# Patient Record
Sex: MALE | Race: ASIAN | Employment: UNEMPLOYED | URBAN - METROPOLITAN AREA
[De-identification: names, ages, dates, MRNs, and addresses within clinical notes are randomized per-mention and may not be internally consistent; named-entity substitution may affect disease eponyms.]

---

## 2022-02-18 ENCOUNTER — HOSPITAL ENCOUNTER (EMERGENCY)
Facility: CLINIC | Age: 52
Discharge: HOME | End: 2022-02-18
Attending: EMERGENCY MEDICINE | Admitting: EMERGENCY MEDICINE

## 2022-02-18 VITALS
DIASTOLIC BLOOD PRESSURE: 86 MMHG | WEIGHT: 117.5 LBS | BODY MASS INDEX: 20.06 KG/M2 | HEIGHT: 64 IN | OXYGEN SATURATION: 96 % | TEMPERATURE: 98.2 F | SYSTOLIC BLOOD PRESSURE: 129 MMHG | HEART RATE: 64 BPM | RESPIRATION RATE: 16 BRPM

## 2022-02-18 DIAGNOSIS — F10.920 ALCOHOLIC INTOXICATION WITHOUT COMPLICATION (H): ICD-10-CM

## 2022-02-18 DIAGNOSIS — T14.91XA SUICIDAL BEHAVIOR WITH ATTEMPTED SELF-INJURY (H): ICD-10-CM

## 2022-02-18 DIAGNOSIS — F10.94 ALCOHOL-INDUCED MOOD DISORDER (H): ICD-10-CM

## 2022-02-18 DIAGNOSIS — F10.10 ALCOHOL ABUSE, EPISODIC DRINKING BEHAVIOR: ICD-10-CM

## 2022-02-18 LAB
ALBUMIN SERPL-MCNC: 3.7 G/DL (ref 3.4–5)
ALP SERPL-CCNC: 63 U/L (ref 40–150)
ALT SERPL W P-5'-P-CCNC: 38 U/L (ref 0–70)
ANION GAP SERPL CALCULATED.3IONS-SCNC: 8 MMOL/L (ref 3–14)
APAP SERPL-MCNC: <2 MG/L (ref 10–30)
AST SERPL W P-5'-P-CCNC: 35 U/L (ref 0–45)
BASOPHILS # BLD AUTO: 0.1 10E3/UL (ref 0–0.2)
BASOPHILS NFR BLD AUTO: 1 %
BILIRUB SERPL-MCNC: 0.4 MG/DL (ref 0.2–1.3)
BUN SERPL-MCNC: 13 MG/DL (ref 7–30)
CALCIUM SERPL-MCNC: 8.3 MG/DL (ref 8.5–10.1)
CHLORIDE BLD-SCNC: 104 MMOL/L (ref 94–109)
CO2 SERPL-SCNC: 24 MMOL/L (ref 20–32)
CREAT SERPL-MCNC: 0.83 MG/DL (ref 0.66–1.25)
EOSINOPHIL # BLD AUTO: 0.5 10E3/UL (ref 0–0.7)
EOSINOPHIL NFR BLD AUTO: 5 %
ERYTHROCYTE [DISTWIDTH] IN BLOOD BY AUTOMATED COUNT: 12.1 % (ref 10–15)
ETHANOL SERPL-MCNC: 0.06 G/DL
ETHANOL SERPL-MCNC: 0.26 G/DL
GFR SERPL CREATININE-BSD FRML MDRD: >90 ML/MIN/1.73M2
GLUCOSE BLD-MCNC: 110 MG/DL (ref 70–99)
HCT VFR BLD AUTO: 48.6 % (ref 40–53)
HGB BLD-MCNC: 17.4 G/DL (ref 13.3–17.7)
HOLD SPECIMEN: NORMAL
HOLD SPECIMEN: NORMAL
IMM GRANULOCYTES # BLD: 0.1 10E3/UL
IMM GRANULOCYTES NFR BLD: 1 %
LYMPHOCYTES # BLD AUTO: 2.2 10E3/UL (ref 0.8–5.3)
LYMPHOCYTES NFR BLD AUTO: 21 %
MCH RBC QN AUTO: 32.2 PG (ref 26.5–33)
MCHC RBC AUTO-ENTMCNC: 35.8 G/DL (ref 31.5–36.5)
MCV RBC AUTO: 90 FL (ref 78–100)
MONOCYTES # BLD AUTO: 0.6 10E3/UL (ref 0–1.3)
MONOCYTES NFR BLD AUTO: 5 %
NEUTROPHILS # BLD AUTO: 7.2 10E3/UL (ref 1.6–8.3)
NEUTROPHILS NFR BLD AUTO: 67 %
NRBC # BLD AUTO: 0 10E3/UL
NRBC BLD AUTO-RTO: 0 /100
PLATELET # BLD AUTO: 210 10E3/UL (ref 150–450)
POTASSIUM BLD-SCNC: 3.5 MMOL/L (ref 3.4–5.3)
PROT SERPL-MCNC: 7.6 G/DL (ref 6.8–8.8)
RBC # BLD AUTO: 5.4 10E6/UL (ref 4.4–5.9)
SALICYLATES SERPL-MCNC: 4 MG/DL
SARS-COV-2 RNA RESP QL NAA+PROBE: NEGATIVE
SODIUM SERPL-SCNC: 136 MMOL/L (ref 133–144)
WBC # BLD AUTO: 10.7 10E3/UL (ref 4–11)

## 2022-02-18 PROCEDURE — 87635 SARS-COV-2 COVID-19 AMP PRB: CPT | Performed by: EMERGENCY MEDICINE

## 2022-02-18 PROCEDURE — 82077 ASSAY SPEC XCP UR&BREATH IA: CPT | Performed by: EMERGENCY MEDICINE

## 2022-02-18 PROCEDURE — 99213 OFFICE O/P EST LOW 20 MIN: CPT | Performed by: PSYCHIATRY & NEUROLOGY

## 2022-02-18 PROCEDURE — 80179 DRUG ASSAY SALICYLATE: CPT | Performed by: EMERGENCY MEDICINE

## 2022-02-18 PROCEDURE — 36415 COLL VENOUS BLD VENIPUNCTURE: CPT | Performed by: EMERGENCY MEDICINE

## 2022-02-18 PROCEDURE — 80053 COMPREHEN METABOLIC PANEL: CPT | Performed by: EMERGENCY MEDICINE

## 2022-02-18 PROCEDURE — C9803 HOPD COVID-19 SPEC COLLECT: HCPCS

## 2022-02-18 PROCEDURE — 85014 HEMATOCRIT: CPT | Performed by: EMERGENCY MEDICINE

## 2022-02-18 PROCEDURE — 80143 DRUG ASSAY ACETAMINOPHEN: CPT | Performed by: EMERGENCY MEDICINE

## 2022-02-18 PROCEDURE — 99283 EMERGENCY DEPT VISIT LOW MDM: CPT

## 2022-02-18 NOTE — ED NOTES
Patient denies suicidal ideation. Reviewed safety plan, follow up care plan, and out patient rescources given to the patient.No medication for the patient. Patient verbalizes understanding of theses things along with all discharge instructions.  Patient has a copy of the AVS and verbalizes understanding of them. All belongings brought into the hospital were given to the patient at discharge.  Patient escorted off the unit. Patient discharged to home via cab at 1240.

## 2022-02-18 NOTE — DISCHARGE INSTRUCTIONS
"Please refrain from drinking too much alcohol or continue to limit self to 2 beers.     Crisis [facility]  [email or web address], [place] has 3 locations  Maritonya Sinha - [place]; Celina Green - [place]; Shreya Venegas  Call their Central Intake at [phone], follow prompts for Crisis Referral and self-referral.  -----  [facility] - [place]  Call [phone]  -----  [facility] - [place]  Call [phone]  ----  [facility]  Call [phone]  ---  [facility] [place]  Call [phone]    Aftercare Plan  If I am feeling unsafe or I am in a crisis, I will:   Contact my established care providers   Call the National Suicide Prevention Lifeline: 758.166.8474   Go to the nearest emergency room   Call 465     Warning signs that I or other people might notice when a crisis is developing for me: drinking too much, isolating in my room, not getting out of the apartment    Things I am able to do on my own to cope or help me feel better: play on my laptop     Things that I am able to do with others to cope or help me feel better: spend time with my roommate     Things I can use or do for distraction: laptop, call family in [place]     Changes I can make to support my mental health and wellness: reduce alcohol consumption, only drink beer     People in my life that I can ask for help: roommate, Julián     Your [facility] has a mental health crisis team you can call 24/7: [facility] Mobile Crisis  [phone] (adults)  [phone] (children)    Other things that are important when I m in crisis: nothing \"no problems now\"    Crisis Lines  Crisis Text Line  Text 828680  You will be connected with a trained live crisis counselor to provide support.  Por juanis, katiao  BRENT a 490113 o texto a 442-AYUDAME en WhatsApp  National Hope Line  1.800.SUICIDE [8279003]    Community Resources  Fast Tracker  Linking people to mental health and substance use disorder " "resources  Simplern.MeSixty   [place] Mental Health Warm Line  Peer to peer support  Monday thru Saturday, 12 pm to 10 pm  914.159.9830 or 5.333.631.5812  Text \"Support\" to 73729  National [place] on Mental Illness (JULIET)  155.221.2894 or 1.888.JULIET.HELPS    Mental Health Apps  My3  https://mySpendCrowdpp.org/  VirtualHopeBox  https://[email or web address]/apps/virtual-hope-box/  "

## 2022-02-18 NOTE — ED PROVIDER NOTES
EmPATH Unit - Psychiatric Consultation  [facility] Emergency Department    Thad Sheppard MRN: [id number]   Age: 52 year old YOB: 1970     History     Chief Complaint   Patient presents with     Alcohol Intoxication     HPI  Thad Sheppard is a 52 year old male with no prior psychiatric history who was brought to the emergency department by EMS after his roommate called 911 reporting concern for a suicidal gesture in the context of alcohol intoxication.  Records indicate that the patient had held a knife to his throat while verbalizing suicidal thoughts.  His blood alcohol level in the emergency room was noted to be 0.26.  Once determined to be medically stable, he was transferred to the EmPATH unit for psychiatric assessment.  On examination today, the patient has regained sobriety and is adamantly denying suicidal ideation.  He attributes his initial presentation as being related to alcohol intoxication.  He denied daily alcohol usage to warrant concern for the risk of withdrawal.  He perceives the ability to abstain from alcohol use.  He is looking forward to a trip to [facility] next week to visit his wife and children.  He denied any overwhelming psychosocial stressors.  He denied symptoms of a depressive episode.  There was no indication of homicidal ideation or psychosis.  He is not seeking any specific treatment resources today and anticipates discharge home after our meeting.    Past Medical History  No past medical history on file.  No past surgical history on file.  No current outpatient medications on file.    No Known Allergies  Family History  No family history on file.  Social History   Social History     Tobacco Use     Smoking status: Not on file     Smokeless tobacco: Not on file   Substance Use Topics     Alcohol use: Not on file     Drug use: Not on file      Past medical history, past surgical history, medications, allergies, family history, and social history were reviewed with the  "patient. No additional pertinent items.       Review of Systems  A complete review of systems was performed with pertinent positives and negatives noted in the HPI, and all other systems negative.    Physical Examination   BP: 115/84  Pulse: 83  Temp: 96.9  F (36.1  C)  Resp: 16  Height: 162.6 cm (5' 4\")  Weight: 53.3 kg (117 lb 8 oz)  SpO2: 97 %    Physical Exam  General: Appears stated age.   Neuro: Alert and fully oriented. Extremities appear to demonstrate normal strength on visual inspection.   Integumentary/Skin: no rash visualized, normal color    Psychiatric Examination   Appearance: awake, alert  Attitude:  cooperative  Eye Contact:  fair  Mood:  better  Affect:  appropriate and in normal range  Speech:  clear, coherent  Psychomotor Behavior:  no evidence of tardive dyskinesia, dystonia, or tics  Thought Process:  logical and linear  Associations:  no loose associations  Thought Content:  no evidence of suicidal ideation or homicidal ideation and no evidence of psychotic thought  Insight:  fair  Judgement:  intact  Oriented to:  time, person, and place  Attention Span and Concentration:  fair  Recent and Remote Memory:  fair  Language: able to name/identify objects without impairment  Fund of Knowledge: intact with awareness of current and past events    ED Course        Labs Ordered and Resulted from Time of ED Arrival to Time of ED Departure   COMPREHENSIVE METABOLIC PANEL - Abnormal       Result Value    Sodium 136      Potassium 3.5      Chloride 104      Carbon Dioxide (CO2) 24      Anion Gap 8      Urea Nitrogen 13      Creatinine 0.83      Calcium 8.3 (*)     Glucose 110 (*)     Alkaline Phosphatase 63      AST 35      ALT 38      Protein Total 7.6      Albumin 3.7      Bilirubin Total 0.4      GFR Estimate >90     ETHYL ALCOHOL LEVEL - Abnormal    Alcohol ethyl 0.26 (*)    ACETAMINOPHEN LEVEL - Abnormal    Acetaminophen <2 (*)    ETHYL ALCOHOL LEVEL - Abnormal    Alcohol ethyl 0.06 (*)    SALICYLATE " LEVEL - Normal    Salicylate 4     COVID-19 VIRUS (CORONAVIRUS) BY PCR - Normal    SARS CoV2 PCR Negative     CBC WITH PLATELETS AND DIFFERENTIAL    WBC Count 10.7      RBC Count 5.40      Hemoglobin 17.4      Hematocrit 48.6      MCV 90      MCH 32.2      MCHC 35.8      RDW 12.1      Platelet Count 210      % Neutrophils 67      % Lymphocytes 21      % Monocytes 5      % Eosinophils 5      % Basophils 1      % Immature Granulocytes 1      NRBCs per 100 WBC 0      Absolute Neutrophils 7.2      Absolute Lymphocytes 2.2      Absolute Monocytes 0.6      Absolute Eosinophils 0.5      Absolute Basophils 0.1      Absolute Immature Granulocytes 0.1      Absolute NRBCs 0.0     ALCOHOL BREATH TEST POCT       Assessments & Plan (with Medical Decision Making)   Patient presenting with suicidal ideation in the context of alcohol intoxication. Nursing notes reviewed noting no acute issues.     I have reviewed the assessment completed by the [facility].     Preliminary diagnosis:    ICD-10-CM    1. Alcoholic intoxication without complication (H)  F10.920    2. Suicidal behavior with attempted self-injury (H)  T14.91XA    3. Alcohol-induced mood disorder (H)  F10.94    4. Alcohol abuse, episodic drinking behavior  F10.10         Treatment Plan:  -The patient is not exhibiting any symptoms of alcohol withdrawal today.  He is regained sobriety which appears to correlate to improvements in his mood and remission of suicidal thoughts.  An alcohol induced mood disorder is highly suspected.  The patient was educated regarding this likelihood and strongly encouraged to abstain from alcohol usage.  He is not seeking formal substance use disorder treatments today.  He foresees the ability to abstain from alcohol.  -There is no indication to initiate antidepressant medications.  The patient is not interested in utilizing psychotropic medications for any particular symptom relief.  -He is not seeking resources for individual  psychotherapy  -Discharge home today.    Suicide risk assessment: The patients acute suicide risk is low due to the following factors: no active impairing mood/anxiety symptoms.  Denies suicidal ideations. Denies psychotic symptoms.  Not actively intoxicated and plans to abstain from illicit substances and alcohol.  Denies access to guns.  Denies feeling hopeless or helpless. At the time of discharge Thad Sheppard was determined to not be an immediate danger to herself or others. The patient's acute risk will be higher if noncompliant with treatment plan, medications, follow-up or using illicit substances or alcohol.  Preventative factors include: social supports, children, stable housing, employment            --  Ari Marino MD   [facility] EMERGENCY DEPT  EmPATH Unit  2/18/2022      Ari Marino MD  02/18/22 1892

## 2022-02-18 NOTE — CONSULTS
2/18/2022  Thad Sheppard 1970     [facility] Crisis Assessment    Patient was assessed: in person  Patient location: [facility] - consult room A    Referral Data and Chief Complaint  Thad is a 52 year old who uses he/him pronouns. Patient presented to the ED via EMS and was referred to the ED by family/friends. Patient is presenting to the ED for the following concerns: suicidal ideation secondary to alcohol intoxication.      Informed Consent and Assessment Methods    Patient is his own guardian. Writer met with patient and explained the crisis assessment process, including applicable information disclosures and limits to confidentiality, assessed understanding of the process, and obtained consent to proceed with the assessment. Patient was observed to be able to participate in the assessment as evidenced by acknowledge role of writer and reason for assessment, answered questions to the best of his ability with language barrier, and discussed discharge planning. . Assessment methods included conducting a formal interview with patient, review of medical records, collaboration with medical staff, and obtaining relevant collateral information from family and community providers when available.    Narrative Summary of Presenting Problem and Current Functioning  What led to the patient presenting for crisis services, factors that make the crisis life threatening or complex, stressors, how is this disrupting the patient's life, and how current functioning is in comparison to baseline. How is patient presenting during the assessment.     Patient transferred to [facility] this morning after spending the night in the ED where he presented via EMS for alcohol intoxication and suicidal ideation.  Patient has a strong  accent and speaks English as his 2nd language; he does understand basic English and responds with equally basic English.  Patient declined using an interpretor for this assessment.      Patient's roommate  "called for 911 last night when patient reportedly took a knife and held it to his throat.  Patient denies this action and states \"knives are for cooking only.\"  Patient does admit to drinking hard liquor last night compared to his usual beer consumption which he limits to two beers.  Patient identifies \"no problems\" this morning and denies being under stress or having anything bothering him.  Patient states he is going to [facility] on Monday for one month to see his family.     Patient identifies not having any medication prescribed.  When asked about his mood, he shaked his head no.  Patient denies any trouble sleeping and states he is not hungry right now.  Patient denies AH, VH, paranoia, delusions.    Patient says he is not having any thoughts to harm himself or others, denies wanting to be dead.   This hospital encounter in the only encounter in his chart so historical information is limited.      Patient presents during assessment with bright affect, speech is culturally appropriate with faster pace however language barrier is somewhat impeding; hygiene appears appropriate yet his jacket had a strong cigarette order to it.     History of the Crisis  Duration of the current crisis, coping skills attempted to reduce the crisis, community resources used, and past presentations.    Duration of current crisis is last night.  He has no established coping skills.  No community resources. No previous presentations to compare.     Collateral Information  The following information was received from Julián whose relationship to the patient is roommate. Information was obtained via phone call. Their phone number is [phone] and they last had contact with patient last night.    What happened today: \"He drank too much last night.\"  He has never made suicidal statements before.     What is different about patient's functioning: he drank too much, he lost his job recently    Concern about alcohol/drug use: No, drank too much " last night. Usually able to limit his drinking    What do you think the patient needs: nothing really, he can come home today    Has patient made comments about wanting to kill themselves/others:  Yes last night was the first time he has ever said something like that    If d/c is recommended, can they take part in safety/aftercare planning: Julián indicated he feels safe with patient returning to the apartment today and asked if we could cab him home.       Risk Assessment    Risk of Harm to Self     ESS-6  1.a. Over the past 2 weeks, have you had thoughts of killing yourself? Yes while intoxicated  1.b. Have you ever attempted to kill yourself and, if yes, when did this last happen? No   2. Recent or current suicide plan? No   3. Recent or current intent to act on ideation? No  4. Lifetime psychiatric hospitalization? No  5. Pattern of excessive substance use? No  6. Current irritability, agitation, or aggression? No  Scoring note: BOTH 1a and 1b must be yes for it to score 1 point, if both are not yes it is zero. All others are 1 point per number. If all questions 1a/1b - 6 are no, risk is negligible. If one of 1a/1b is yes, then risk is mild. If either question 2 or 3, but not both, is yes, then risk is automatically moderate regardless of total score. If both 2 and 3 are yes, risk is automatically high regardless of total score.     Score: 0, mild risk    The patient has the following risk factors for suicide: isolation, lack of support and recent loss  Is the patient experiencing current suicidal ideation: No  Is the patient engaging in preparatory suicide behaviors (formulating how to act on plan, giving away possessions, saying goodbye, displaying dramatic behavior changes, etc)? No  Does the patient have access to firearms or other lethal means? no    The patient has the following protective factors: social support, displays resiliency , future focused thinking, displays insight, sense of obligation to  people/pets and safe/stable housing  Support system information: roommate and family in [place]  Patient strengths: , family-oriented    Does the patient engage in non-suicidal self-injurious behavior (NSSI/SIB)? no  Is the patient vulnerable to sexual exploitation?  No  Is the patient experiencing abuse or neglect? no  Is the patient a vulnerable adult? No      Risk of Harm to Others  The patient has the following risk factors of harm to others: no risk factors identified  Does the patient have thoughts of harming others? No  Is the patient engaging in sexually inappropriate behavior?  no     Current Substance Abuse  Is there recent substance abuse? Substance type(s): beer Frequency: nightly Quantity: two beers Method: oral Duration: the night Last use: last night  Was a urine drug screen or blood alcohol level obtained: Yes 0.26 @ 0040 and retested at 0.06 @ 0825    CAGE AID  Have you felt you ought to cut down on your drinking or drug use?  No  Have people annoyed you by criticizing your drinking or drug use? No  Have you felt bad or guilty about your drinking or drug use? No  Have you ever had a drink or used drugs first thing in the morning to steady your nerves or to get rid of a hangover? No  Score: 0/4       Current Symptoms/Concerns  Symptoms  Attention, hyperactivity, and impulsivity symptoms present: No  Anxiety symptoms present: No    Appetite symptoms present: No   Behavioral difficulties present: No   Cognitive impairment symptoms present: No  Depressive symptoms present: No  Eating disorder symptoms present: No  Learning disabilities, cognitive challenges, and/or developmental disorder symptoms present: No   Manic/hypomanic symptoms present: No  Personality and interpersonal functioning difficulties present : No  Psychosis symptoms present: No    Sleep difficulties present: No  Substance abuse disorder symptoms present: Yes Substance(s) taken in larger amounts or over a longer period than intended    Trauma and stressor related symptoms present: No       Mental Status Exam   Affect: Appropriate   Appearance: Appropriate    Attention Span/Concentration: Attentive?    Eye Contact: Engaged   Fund of Knowledge: Appropriate    Language /Speech Content: Other: english is 2nd lanuage; understands basics of English   Language /Speech Volume: Normal    Language /Speech Rate/Productions: Normal    Recent Memory: Intact   Remote Memory: Intact   Mood: Normal    Orientation to Person: Yes    Orientation to Place: Yes   Orientation to Time of Day: Yes    Orientation to Date: Yes    Situation (Do they understand why they are here?): Yes    Psychomotor Behavior: Normal    Thought Content: Clear   Thought Form: Intact       Mental Health and Substance Abuse History  History  Current and historical diagnoses or mental health concerns: no hx; current: alcohol intoxication  Prior MH services (inpatient, programmatic care, outpatient, etc) : No  Has the patient used [facility] crisis team services before?: No  History of substance abuse: No  Prior [place] services (inpatient, programmatic care, detox, outpatient, etc) : No  History of commitment: No  Family history of MH/[place]: did not disclose  Trauma history: No    Medication  Psychotropic medications: No    Current Care Team  Primary Care Provider: No  Psychiatrist: No  Therapist: No  : No  CTSS or ARMHS: No  ACT Team: No  Other: No    Release of Information  Was a release of information signed: No. Reason: no providers and roommate called patient's cell phone when he was trying to give me their phone number    Biopsychosocial Information  Socioeconomic Information  Current living situation: lives with a roommate in [place]  Employment/income source: unemployed at the moment; lost his job recently at an [employer]  Relevant legal issues: none noted  Cultural, Catholic, or spiritual influences on mental health care:  heritage and practices Jewish  Is the  patient active in the  or a : No      Relevant Medical Concerns   Patient identifies concerns with completing ADLs? No   Patient can ambulate independently? Yes   Other medical concerns? No   History of concussion or TBI? No      Diagnosis    Substance-Related & Addictive Disorders Alcohol Intoxication  303.00 (F10.929) Alcohol Intoxication without use disorder - provisional      Adjustment Disorders  309.9 (F43.20) Unspecified - provisional       Therapeutic Intervention  The following therapeutic methodologies were employed when working with the patient: establishing rapport, active listening, assessing dimensions of crisis, solution focused brief therapy, identifying additional supports and alternative coping skills, establishing a discharge plan, safety planning, psychoeducation and harm reduction. Patient response to intervention: engaged and receptive.      Disposition  Recommended disposition: Other: discharge home    Reviewed case and recommendations with attending provider. Attending Name: Jamila    Attending concurs with disposition: Yes    Patient concurs with disposition: Yes    Guardian concurs with disposition: NA   Final disposition: Other: discharge home as patient declines all other referral resources.     Clinical Substantiation of Recommendations   Rationale with supporting factors for disposition and diagnosis.     Patient spent the night in the ED and metabolized the alcohol in his system by this morning where he presents with clear thoughts, denies SI, and expresses wish to discharge home.  He declines any referrals for supports in the community citing he is leaving for [facility] on Monday for one month.       Assessment Details  Patient interview started at: 0945 and completed at: 1015.  Total duration spent on the patient case in minutes: .75 hrs   CPT code(s) utilized: 56030 - Psychotherapy for Crisis - 60 (30-74*) min       Aftercare and Safety Planning  Follow up plans with  "MH/[place] services: No  due to patient declining follow up services  Aftercare plan placed in the AVS and provided to patient: Yes. Given to patient by EmPATH TOM[facility]S Man      Aftercare Plan  If I am feeling unsafe or I am in a crisis, I will:   Contact my established care providers   Call the National Suicide Prevention Lifeline: 819.995.5352   Go to the nearest emergency room   Call 911     Warning signs that I or other people might notice when a crisis is developing for me: drinking too much, isolating in my room, not getting out of the apartment    Things I am able to do on my own to cope or help me feel better: play on my laptop     Things that I am able to do with others to cope or help me feel better: spend time with my roommate     Things I can use or do for distraction: laptop, call family in [facility]     Changes I can make to support my mental health and wellness: reduce alcohol consumption, only drink beer     People in my life that I can ask for help: roommate, [facility] has a mental health crisis team you can call 24/7: [facility] Mobile Crisis  [phone] (adults)  [phone] (children)    Other things that are important when I m in crisis: nothing \"no problems now\"    Crisis Lines  Crisis Text Line  Text 290505  You will be connected with a trained live crisis counselor to provide support.  Por espanol, texto  BRENT a 809064 o texto a 442-AYUDAME en WhatsA  National Hope Line  1.800.SUICIDE [0639068]    Community Resources  Fast Tracker  Linking people to mental health and substance use disorder resources  fasttrackermn.org   [place] Mental Health Warm Line  Peer to peer support  Monday thru Saturday, 12 pm to 10 pm  663.926.8873 or 5.753.175.0661  Text \"Support\" to 84251  National [place] on Mental Illness (JULIET)  266.193.4131 or 1.888.JULIET.HELPS    Mental Health Apps  My3  https://myOlacabspp.org/  VirtualHopeBox  " [email or web address]://[email or web address].[email or web address]/apps/virtual-hope-box/

## 2022-02-18 NOTE — ED TRIAGE NOTES
"Lost job 4 days ago, he has been drinking heavily. He held a knife to his throat according to his roommate. EMS stated when they got there this patient got dressed and then \"dramatically flopped onto his bed and would no longer converse with them.\"   "

## 2022-02-18 NOTE — ED NOTES
Patient received from ED this am. Patient refuses an . His primary language is Tamil. Patient denies suicidal or homicidal ideation.  Patient denies A/V hallucinations.   Patient states he drinks 2 beers every night.  Last night was the first time he drank hard liquor.  Patient is calm and cooperative.  He states that he does not take medications.  He plans on going back to [place] for one month on Monday. Assessments reviewed with [facility] and physician. Video monitoring in progress, patient informed.  Admission information reviewed with patient. Patient given a tour of [facility] and instructions on using the facility. Questions regarding [facility] addressed. Pt search completed and belongings inventoried.

## 2022-02-18 NOTE — ED NOTES
Bed: ED21  Expected date:   Expected time:   Means of arrival:   Comments:  [address]ico [age]M suicidal, etoh - hold eta 0007

## 2022-02-18 NOTE — ED PROVIDER NOTES
History   Chief Complaint:  Alcohol Intoxication       The history is provided by the patient. The history is limited by the condition of the patient.      Thad Sheppard is a 52 year old male who presents via EMS with alcohol intoxication. EMS reports that the patient lost his job four days ago, and has been drinking heavily. They state that tonight he held a knife to his throat, and his roommate became worried and called them. The patient states that he only had two beers.  Speech is slurred and brief.  History limited.  He denies any daily medications, allergies, or previous surgeries; no history is present in his chart; and none noted in CareEverywhere.    Review of Systems   Unable to perform ROS: Mental status change     Allergies:  The patient has no known allergies.     Medications:  The patient is currently on no regular medications.    Past Medical History:     The patient denies any significant past medical history.    Social History:  Presents unaccompanied  Alcohol use: positive    Physical Exam     Patient Vitals for the past 24 hrs:   BP Temp Temp src Pulse Resp SpO2   02/18/22 0600 (!) 119/94 -- -- 81 12 --   02/18/22 0500 108/80 -- -- 84 15 --   02/18/22 0400 109/76 -- -- 82 14 --   02/18/22 0300 99/76 -- -- 85 17 --   02/18/22 0200 98/72 -- -- 89 16 100 %   02/18/22 0115 104/78 -- -- 84 16 93 %   02/18/22 0100 106/76 -- -- 86 16 95 %   02/18/22 0045 114/84 -- -- 82 13 95 %   02/18/22 0015 115/84 96.9  F (36.1  C) Temporal 83 16 97 %       Physical Exam  Eyes:               Sclera white; Pupils are equal and round  ENT:                External ears and nares normal  CV:                  Rate as above with regular rhythm   Resp:               Breath sounds clear and equal bilaterally                          Non-labored, no retractions or accessory muscle use  GI:                   Abdomen is soft, non-tender, non-distended                          No rebound tenderness or peritoneal  features  MS:                  Moves all extremities  Skin:                Warm and dry  Neuro:             Speech is understandable, brief and a few words at a time.  Eyes open, smiling.    Emergency Department Course     Laboratory:  Labs Ordered and Resulted from Time of ED Arrival to Time of ED Departure   COMPREHENSIVE METABOLIC PANEL - Abnormal       Result Value    Sodium 136      Potassium 3.5      Chloride 104      Carbon Dioxide (CO2) 24      Anion Gap 8      Urea Nitrogen 13      Creatinine 0.83      Calcium 8.3 (*)     Glucose 110 (*)     Alkaline Phosphatase 63      AST 35      ALT 38      Protein Total 7.6      Albumin 3.7      Bilirubin Total 0.4      GFR Estimate >90     ETHYL ALCOHOL LEVEL - Abnormal    Alcohol ethyl 0.26 (*)    ACETAMINOPHEN LEVEL - Abnormal    Acetaminophen <2 (*)    SALICYLATE LEVEL - Normal    Salicylate 4     COVID-19 VIRUS (CORONAVIRUS) BY PCR - Normal    SARS CoV2 PCR Negative     CBC WITH PLATELETS AND DIFFERENTIAL    WBC Count 10.7      RBC Count 5.40      Hemoglobin 17.4      Hematocrit 48.6      MCV 90      MCH 32.2      MCHC 35.8      RDW 12.1      Platelet Count 210      % Neutrophils 67      % Lymphocytes 21      % Monocytes 5      % Eosinophils 5      % Basophils 1      % Immature Granulocytes 1      NRBCs per 100 WBC 0      Absolute Neutrophils 7.2      Absolute Lymphocytes 2.2      Absolute Monocytes 0.6      Absolute Eosinophils 0.5      Absolute Basophils 0.1      Absolute Immature Granulocytes 0.1      Absolute NRBCs 0.0        Emergency Department Course:         Reviewed:  I reviewed nursing notes, vitals, past medical history and Care Everywhere    Assessments:  0031 I obtained history and examined the patient as noted above.    Patient sleeping    Impression & Plan     Medical Decision Making:  Thad Sheppard presents with altered mental status, history of alcohol use, and a suicidal gesture. History is limited due to his alcohol intoxication and a full  evaluation for suicidality cannot be completed until he is more sober.  At this time I do not see any obvious injuries or wounds on him.  Blood work was checked and notable for an elevated alcohol level.  There was no evidence for toxic coingestions.  Since this was precipitated by losing his job 4 days ago he would benefit from DEC evaluation due to both suicidal behavior and excessive alcohol in response to acute stress.  COVID negative and when appropriately sober will be medically cleared for further mental health assessment.    Signed out to Dr. Vigil.      Diagnosis:    ICD-10-CM    1. Altered mental status, unspecified altered mental status type  R41.82    2. Alcoholic intoxication without complication (H)  F10.920    3. Suicidal behavior with attempted self-injury (H)  T14.91XA        Scribe Disclosure:  MARLA, Bonnie Patel, am serving as a scribe at 12:30 AM on 2/18/2022 to document services personally performed by Frannie Miller MD* based on my observations and the provider's statements to me.        Frannie Miller MD  02/18/22 [id number]